# Patient Record
Sex: FEMALE | Race: WHITE | NOT HISPANIC OR LATINO
[De-identification: names, ages, dates, MRNs, and addresses within clinical notes are randomized per-mention and may not be internally consistent; named-entity substitution may affect disease eponyms.]

---

## 2021-01-05 DIAGNOSIS — R92.2 INCONCLUSIVE MAMMOGRAM: ICD-10-CM

## 2021-01-05 DIAGNOSIS — Z78.9 OTHER SPECIFIED HEALTH STATUS: ICD-10-CM

## 2021-01-05 DIAGNOSIS — Z80.1 FAMILY HISTORY OF MALIGNANT NEOPLASM OF TRACHEA, BRONCHUS AND LUNG: ICD-10-CM

## 2021-01-05 DIAGNOSIS — C50.411 MALIGNANT NEOPLASM OF UPPER-OUTER QUADRANT OF RIGHT FEMALE BREAST: ICD-10-CM

## 2021-01-05 DIAGNOSIS — R92.8 OTHER ABNORMAL AND INCONCLUSIVE FINDINGS ON DIAGNOSTIC IMAGING OF BREAST: ICD-10-CM

## 2021-01-05 DIAGNOSIS — N63.0 UNSPECIFIED LUMP IN UNSPECIFIED BREAST: ICD-10-CM

## 2021-01-05 DIAGNOSIS — R92.0 MAMMOGRAPHIC MICROCALCIFICATION FOUND ON DIAGNOSTIC IMAGING OF BREAST: ICD-10-CM

## 2021-01-05 PROBLEM — Z00.00 ENCOUNTER FOR PREVENTIVE HEALTH EXAMINATION: Status: ACTIVE | Noted: 2021-01-05

## 2021-01-05 RX ORDER — OXYCODONE HYDROCHLORIDE AND ACETAMINOPHEN 5; 325 MG/1; MG/1
5-325 TABLET ORAL
Refills: 0 | Status: ACTIVE | COMMUNITY

## 2021-01-05 RX ORDER — CEFADROXIL 500 MG/1
500 CAPSULE ORAL TWICE DAILY
Refills: 0 | Status: ACTIVE | COMMUNITY

## 2021-01-11 ENCOUNTER — APPOINTMENT (OUTPATIENT)
Dept: BREAST CENTER | Facility: CLINIC | Age: 56
End: 2021-01-11
Payer: COMMERCIAL

## 2021-01-11 VITALS
OXYGEN SATURATION: 100 % | TEMPERATURE: 98.1 F | WEIGHT: 117 LBS | DIASTOLIC BLOOD PRESSURE: 92 MMHG | HEART RATE: 79 BPM | SYSTOLIC BLOOD PRESSURE: 147 MMHG | HEIGHT: 61 IN | RESPIRATION RATE: 16 BRPM | BODY MASS INDEX: 22.09 KG/M2

## 2021-01-11 DIAGNOSIS — Z85.3 PERSONAL HISTORY OF MALIGNANT NEOPLASM OF BREAST: ICD-10-CM

## 2021-01-11 PROCEDURE — 99213 OFFICE O/P EST LOW 20 MIN: CPT

## 2021-01-11 PROCEDURE — 99072 ADDL SUPL MATRL&STAF TM PHE: CPT

## 2021-01-11 RX ORDER — ANASTROZOLE TABLETS 1 MG/1
1 TABLET ORAL
Refills: 0 | Status: ACTIVE | COMMUNITY

## 2021-01-11 NOTE — HISTORY OF PRESENT ILLNESS
[FreeTextEntry1] : The patient is a 55-year-old  postmenopausal female of [place] descent.  She underwent menarche at age 11 and had a first child at age 23.  She has no family history of breast or ovarian cancer.  The patient felt the right breast mass towards the upper outer quadrant of the right breast at the end of  and was seen by her gynecologist and underwent a mammography and ultrasound on 2016 at [facility] showing an irregular density with pleomorphic calcifications on mammography in the right breast upper outer quadrant with a corresponding 3.3 cm irregular mass seen on ultrasound at the 10:00 region.  There were no suspicious lymph node seen on ultrasound.  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  She underwent an MRI  at [facility] which showed the 3 cm cancer in the 10:00 region of the right breast with at least 3 separate satellite lesions but no suspicious nodes.  There was a left breast retroareolar density which was suspicious and she underwent an MRI guided core biopsy on 2016 which showed a sclerosing intraductal papilloma.  The decision was made to go forward with a mastectomy and she underwent a right breast total mastectomy and sentinel lymph node biopsy followed by an axillary lymph node dissection on 2016.  She had an expander reconstruction due to the positive lymph node.  Final pathology showed a 5 cm poorly differentiated invasive duct cancer which had lymphovascular invasion.  She had 1 out of 12 positive lymph nodes with a 6 mm macro metastasis but no extranodal extension.  Final pathology showed the cancer to be ER/ID positive and it was HER-2 negative at the time of the mastectomy.  She had a pathologic anatomic stage IIB breast cancer and pathologic prognostic stage IB breast cancer she underwent adjuvant ACT chemotherapy with Dr. Brook Kasper from May 25, 2016 until 2016.  She then underwent postmastectomy radiation at [facility] which finished on 2016.  She underwent a ALISSA/BSO and was placed on Arimidex on 2017.  She did have her right breast expander exchange for permanent implant and had a left breast reduction mastopexy and implant placed prior to her radiation by Dr. Barbosa.  She comes in now for routine follow-up.

## 2021-01-11 NOTE — PAST MEDICAL HISTORY
[Postmenopausal] : The patient is postmenopausal [Menarche Age ____] : age at menarche was [unfilled] [Menopause Age____] : age at menopause was [unfilled] [Total Preg ___] : G[unfilled] [Live Births ___] : P[unfilled]  [Age At Live Birth ___] : Age at live birth: [unfilled] [de-identified] : ALISSA/OLIVIA in 2017

## 2021-01-11 NOTE — ASSESSMENT
[FreeTextEntry1] : The patient is a 55-year-old  postmenopausal female of [place] descent.  She underwent menarche at age 11 and had a first child at age 23.  She has no family history of breast or ovarian cancer.  The patient felt the right breast mass towards the upper outer quadrant of the right breast at the end of  and was seen by her gynecologist and underwent a mammography and ultrasound on 2016 at [facility] showing an irregular density with pleomorphic calcifications on mammography in the right breast upper outer quadrant with a corresponding 3.3 cm irregular mass seen on ultrasound at the 10:00 region.  There were no suspicious lymph node seen on ultrasound.  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  She underwent an MRI  at [facility] which showed the 3 cm cancer in the 10:00 region of the right breast with at least 3 separate satellite lesions but no suspicious nodes.  There was a left breast retroareolar density which was suspicious and she underwent an MRI guided core biopsy on 2016 which showed a sclerosing intraductal papilloma.  The decision was made to go forward with a mastectomy and she underwent a right breast total mastectomy and sentinel lymph node biopsy followed by an axillary lymph node dissection on 2016.  She had an expander reconstruction due to the positive lymph node.  Final pathology showed a 5 cm poorly differentiated invasive duct cancer which had lymphovascular invasion.  She had 1 out of 12 positive lymph nodes with a 6 mm macro metastasis but no extranodal extension.  Final pathology showed the cancer to be ER/MA positive and it was HER-2 negative at the time of the mastectomy.  She had a pathologic anatomic stage IIB breast cancer and pathologic prognostic stage IB breast cancer she underwent adjuvant ACT chemotherapy with Dr. Brook Kasper from May 25, 2016 until 2016.  She then underwent postmastectomy radiation at [facility] which finished on 2016.  She underwent a ALISSA/BSO and was placed on Arimidex on 2017.  She did have her right breast expander exchange for permanent implant and had a left breast reduction mastopexy and implant placed prior to her radiation by Dr. Barbosa.  On exam today she has a lot of radiation changes over the right side with some contracture and asymmetry.  The left breast is more ptotic.  I cannot feel any suspicious findings, however.  The patient had her last left breast mammography and ultrasound on 2020 and I reviewed the films which showed no suspicious findings.  The patient will continue to follow-up with Dr. Brook Kasper and remains on Arimidex.  I will see her again in 1 year and her next left breast mammography and ultrasound will be due at the end of 2021.

## 2021-01-11 NOTE — PHYSICAL EXAM
[Normocephalic] : normocephalic [EOMI] : extra ocular movement intact [Supple] : supple [No Supraclavicular Adenopathy] : no supraclavicular adenopathy [No Cervical Adenopathy] : no cervical adenopathy [Normal Sinus Rhythm] : normal sinus rhythm [Examined in the supine and seated position] : examined in the supine and seated position [No dominant masses] : no dominant masses in right breast  [No dominant masses] : no dominant masses left breast [Breast Mass Right Breast ___cm] : no masses [Breast Nipple Inversion Left] : nipple not inverted [Breast Nipple Retraction Left] : nipple not retracted [Breast Nipple Flattening Left] : nipple not flattened [Breast Nipple Fissures Left] : nipple not fissured [Breast Mass Left Breast ___cm] : no masses [No Axillary Lymphadenopathy] : no left axillary lymphadenopathy [Soft] : abdomen soft [Normal Bowel Sounds] : normal bowel sounds  [de-identified] : On exam the patient has the obvious right breast total mastectomy and implant reconstruction on the right side with the mastopexy and implant on the left side for symmetry.  She underwent postmastectomy radiation over the right side and has a definite mild contracture which is not painful to the patient.  She has a lot of radiation changes over the skin.  The left breast is larger and more ptotic and I cannot feel any suspicious findings in the left breast.  She has no axillary, supraclavicular, or cervical adenopathy.

## 2021-01-11 NOTE — REASON FOR VISIT
[Follow-Up: _____] : a [unfilled] follow-up visit [FreeTextEntry1] : 6-month follow-up with history of right breast mastectomy and axillary lymph node dissection for moderately differentiated 5 cm ER/KS positive HER-2/kerry negative invasive duct cancer with 1 out of 12 positive nodes.  This was a pathologic anatomic stage IIB breast cancer and pathologic prognostic stage IB breast cancer.

## 2021-12-20 ENCOUNTER — APPOINTMENT (OUTPATIENT)
Dept: BREAST CENTER | Facility: CLINIC | Age: 56
End: 2021-12-20
Payer: COMMERCIAL

## 2022-01-25 ENCOUNTER — APPOINTMENT (OUTPATIENT)
Dept: BREAST CENTER | Facility: CLINIC | Age: 57
End: 2022-01-25
Payer: COMMERCIAL

## 2022-01-25 VITALS
HEIGHT: 61 IN | OXYGEN SATURATION: 98 % | HEART RATE: 68 BPM | SYSTOLIC BLOOD PRESSURE: 152 MMHG | BODY MASS INDEX: 24.55 KG/M2 | WEIGHT: 130 LBS | DIASTOLIC BLOOD PRESSURE: 94 MMHG

## 2022-01-25 DIAGNOSIS — Z90.11 ACQUIRED ABSENCE OF RIGHT BREAST AND NIPPLE: ICD-10-CM

## 2022-01-25 DIAGNOSIS — Z85.3 PERSONAL HISTORY OF MALIGNANT NEOPLASM OF BREAST: ICD-10-CM

## 2022-01-25 PROCEDURE — 99213 OFFICE O/P EST LOW 20 MIN: CPT

## 2022-01-25 NOTE — PHYSICAL EXAM
[Normocephalic] : normocephalic [EOMI] : extra ocular movement intact [Supple] : supple [No Supraclavicular Adenopathy] : no supraclavicular adenopathy [No Cervical Adenopathy] : no cervical adenopathy [Normal Sinus Rhythm] : normal sinus rhythm [Examined in the supine and seated position] : examined in the supine and seated position [No dominant masses] : no dominant masses in right breast  [No dominant masses] : no dominant masses left breast [Breast Mass Right Breast ___cm] : no masses [Breast Mass Left Breast ___cm] : no masses [No Axillary Lymphadenopathy] : no left axillary lymphadenopathy [No Nipple Retraction] : no left nipple retraction [No Nipple Discharge] : no left nipple discharge [Breast Nipple Inversion Left] : nipple not inverted [Breast Nipple Retraction Left] : nipple not retracted [Breast Nipple Flattening Left] : nipple not flattened [Breast Nipple Fissures Left] : nipple not fissured [Breast Abnormal Lactation (Galactorrhea) Left] : no galactorrhea [Breast Abnormal Secretion Bloody Fluid Left] : no bloody discharge [Breast Abnormal Secretion Serous Fluid Left] : no serous discharge [Breast Abnormal Secretion Opalescent Fluid Left] : no milky discharge [No Edema] : no edema [No Rashes] : no rashes [No Ulceration] : no ulceration [de-identified] : On exam the patient has the obvious right breast total mastectomy and implant reconstruction on the right side with the mastopexy and implant on the left side for symmetry.  She underwent postmastectomy radiation over the right side and has a definite moderate contracture which is not painful to the patient.  She has a lot of radiation changes over the skin.  The left breast is larger and more ptotic and I cannot feel any suspicious findings in the left breast.  She has no axillary, supraclavicular, or cervical adenopathy. [de-identified] : Status post total mastectomy with implant reconstruction and postmastectomy radiation with moderate contracture but no evidence of recurrence. [de-identified] : Status post reduction mastopexy and implant for symmetry with no suspicious findings

## 2022-01-25 NOTE — REASON FOR VISIT
[Follow-Up: _____] : a [unfilled] follow-up visit [FreeTextEntry1] : She comes in with a history of a right breast mastectomy and axillary lymph node dissection for a moderately differentiated 5 cm ER/IA positive HER-2/kerry negative invasive duct cancer with 1 out of 12 positive nodes.  This was a pathologic anatomic stage IIB breast cancer and pathologic prognostic stage IB breast cancer.  She underwent AC-T chemotherapy and then underwent postmastectomy radiation which finished in December 2016.  She was placed on Arimidex in March 2017.  She had her right breast expander exchange for a permanent implant and underwent a left breast reduction mastopexy and implant prior to receiving radiation.  She comes in for routine follow-up.

## 2022-01-25 NOTE — HISTORY OF PRESENT ILLNESS
[FreeTextEntry1] : The patient is a 56-year-old  postmenopausal female of [place] descent.  She underwent menarche at age 11 and had a first child at age 23.  She has no family history of breast or ovarian cancer.  The patient felt the right breast mass towards the upper outer quadrant of the right breast at the end of  and was seen by her gynecologist and underwent a mammography and ultrasound on 2016 at [facility] showing an irregular density with pleomorphic calcifications on mammography in the right breast upper outer quadrant with a corresponding 3.3 cm irregular mass seen on ultrasound at the 10:00 region.  There were no suspicious lymph node seen on ultrasound.  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  She underwent an MRI  at [facility] which showed the 3 cm cancer in the 10:00 region of the right breast with at least 3 separate satellite lesions but no suspicious nodes.  There was a left breast retroareolar density which was suspicious and she underwent an MRI guided core biopsy on 2016 which showed a sclerosing intraductal papilloma.  The decision was made to go forward with a mastectomy and she underwent a right breast total mastectomy and sentinel lymph node biopsy followed by an axillary lymph node dissection on 2016.  She had an expander reconstruction due to the positive lymph node.  Final pathology showed a 5 cm poorly differentiated invasive duct cancer which had lymphovascular invasion.  She had 1 out of 12 positive lymph nodes with a 6 mm macro metastasis but no extranodal extension.  Final pathology showed the cancer to be ER/AR positive and it was HER-2 negative at the time of the mastectomy.  She had a pathologic anatomic stage IIB breast cancer and pathologic prognostic stage IB breast cancer she underwent adjuvant ACT chemotherapy with Dr. Brook Kasper from May 25, 2016 until 2016.  She then underwent postmastectomy radiation at [facility] which finished on 2016.  She underwent a ALISSA/BSO and was placed on Arimidex on 2017.  She did have her right breast expander exchanged for a permanent implant and had a left breast reduction mastopexy and implant placed prior to her radiation by Dr. Barbosa.  She comes in now for routine follow-up.

## 2022-01-25 NOTE — PAST MEDICAL HISTORY
[Postmenopausal] : The patient is postmenopausal [Menarche Age ____] : age at menarche was [unfilled] [Menopause Age____] : age at menopause was [unfilled] [Total Preg ___] : G[unfilled] [Live Births ___] : P[unfilled]  [Age At Live Birth ___] : Age at live birth: [unfilled] [de-identified] : ALISSA/OLIVIA in 2017

## 2022-01-25 NOTE — ASSESSMENT
[FreeTextEntry1] : The patient is a 56-year-old  postmenopausal female of [place] descent.  She underwent menarche at age 11 and had a first child at age 23.  She has no family history of breast or ovarian cancer.  The patient felt the right breast mass towards the upper outer quadrant of the right breast at the end of  and was seen by her gynecologist and underwent a mammography and ultrasound on 2016 at [facility] showing an irregular density with pleomorphic calcifications on mammography in the right breast upper outer quadrant with a corresponding 3.3 cm irregular mass seen on ultrasound at the 10:00 region.  There were no suspicious lymph node seen on ultrasound.  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  She underwent an MRI  at [facility] which showed the 3 cm cancer in the 10:00 region of the right breast with at least 3 separate satellite lesions but no suspicious nodes.  There was a left breast retroareolar density which was suspicious and she underwent an MRI guided core biopsy on 2016 which showed a sclerosing intraductal papilloma.  The decision was made to go forward with a mastectomy and she underwent a right breast total mastectomy and sentinel lymph node biopsy followed by an axillary lymph node dissection on 2016.  She had an expander reconstruction due to the positive lymph node.  Final pathology showed a 5 cm poorly differentiated invasive duct cancer which had lymphovascular invasion.  She had 1 out of 12 positive lymph nodes with a 6 mm macro metastasis but no extranodal extension.  Final pathology showed the cancer to be ER/WY positive and it was HER-2 negative at the time of the mastectomy.  She had a pathologic anatomic stage IIB breast cancer and pathologic prognostic stage IB breast cancer she underwent adjuvant ACT chemotherapy with Dr. Brook Kasper from May 25, 2016 until 2016.  She then underwent postmastectomy radiation at [facility] which finished on 2016.  She underwent a ALISSA/BSO and was placed on Arimidex on 2017.  She did have her right breast expander exchange for a permanent implant and had a left breast reduction mastopexy and implant placed prior to her radiation by Dr. Barbosa.  On exam today, she has a lot of radiation changes over the right side with some contracture and asymmetry.  The left breast is more ptotic.  I cannot feel any suspicious findings, however.  The patient underwent her last left breast mammography and ultrasound which was reviewed from 2021 performed at [facility] which showed no suspicious findings.  The patient will continue to follow-up with Dr. Brook Kasper and remains on Arimidex.  She was reassured and I will see her again in 1 year and her next left breast mammography and ultrasound will be due in 2022 and she was given prescriptions.

## 2022-03-09 NOTE — REASON FOR VISIT
[Follow-Up: _____] : a [unfilled] follow-up visit [FreeTextEntry1] : She comes in with a history of a right breast mastectomy and axillary lymph node dissection for a moderately differentiated 5 cm ER/GA positive HER-2/kerry negative invasive duct cancer with 1 out of 12 positive nodes.  This was a pathologic anatomic stage IIB breast cancer and pathologic prognostic stage IB breast cancer.  She underwent AC-T chemotherapy and then underwent postmastectomy radiation which finished in December 2016.  She was placed on Arimidex in March 2017.  She had her right breast expander exchange for a permanent implant and underwent a left breast reduction mastopexy and implant prior to receiving radiation.  She comes in for routine follow-up.

## 2022-03-09 NOTE — ASSESSMENT
[FreeTextEntry1] : The patient is a 56-year-old  postmenopausal female of [place] descent.  She underwent menarche at age 11 and had a first child at age 23.  She has no family history of breast or ovarian cancer.  The patient felt the right breast mass towards the upper outer quadrant of the right breast at the end of  and was seen by her gynecologist and underwent a mammography and ultrasound on 2016 at [facility] showing an irregular density with pleomorphic calcifications on mammography in the right breast upper outer quadrant with a corresponding 3.3 cm irregular mass seen on ultrasound at the 10:00 region.  There were no suspicious lymph node seen on ultrasound.  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  She underwent an MRI  at [facility] which showed the 3 cm cancer in the 10:00 region of the right breast with at least 3 separate satellite lesions but no suspicious nodes.  There was a left breast retroareolar density which was suspicious and she underwent an MRI guided core biopsy on 2016 which showed a sclerosing intraductal papilloma.  The decision was made to go forward with a mastectomy and she underwent a right breast total mastectomy and sentinel lymph node biopsy followed by an axillary lymph node dissection on 2016.  She had an expander reconstruction due to the positive lymph node.  Final pathology showed a 5 cm poorly differentiated invasive duct cancer which had lymphovascular invasion.  She had 1 out of 12 positive lymph nodes with a 6 mm macro metastasis but no extranodal extension.  Final pathology showed the cancer to be ER/ND positive and it was HER-2 negative at the time of the mastectomy.  She had a pathologic anatomic stage IIB breast cancer and pathologic prognostic stage IB breast cancer she underwent adjuvant ACT chemotherapy with Dr. Brook Kasper from May 25, 2016 until 2016.  She then underwent postmastectomy radiation at [facility] which finished on 2016.  She underwent a ALISSA/BSO and was placed on Arimidex on 2017.  She did have her right breast expander exchange for permanent implant and had a left breast reduction mastopexy and implant placed prior to her radiation by Dr. Barbosa.  On exam today she has a lot of radiation changes over the right side with some contracture and asymmetry.  The left breast is more ptotic.  I cannot feel any suspicious findings, however.  The patient underwent her last left breast mammography and ultrasound which was reviewed from ??????.  The patient will continue to follow-up with Dr. Brook Kasper and remains on Arimidex.  She was reassured and I will see her again in 1 year and her next left breast mammography and ultrasound will be due ?????  and she was given prescriptions.

## 2022-03-09 NOTE — HISTORY OF PRESENT ILLNESS
[FreeTextEntry1] : The patient is a 56-year-old  postmenopausal female of [place] descent.  She underwent menarche at age 11 and had a first child at age 23.  She has no family history of breast or ovarian cancer.  The patient felt the right breast mass towards the upper outer quadrant of the right breast at the end of  and was seen by her gynecologist and underwent a mammography and ultrasound on 2016 at [facility] showing an irregular density with pleomorphic calcifications on mammography in the right breast upper outer quadrant with a corresponding 3.3 cm irregular mass seen on ultrasound at the 10:00 region.  There were no suspicious lymph node seen on ultrasound.  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  She underwent an MRI  at [facility] which showed the 3 cm cancer in the 10:00 region of the right breast with at least 3 separate satellite lesions but no suspicious nodes.  There was a left breast retroareolar density which was suspicious and she underwent an MRI guided core biopsy on 2016 which showed a sclerosing intraductal papilloma.  The decision was made to go forward with a mastectomy and she underwent a right breast total mastectomy and sentinel lymph node biopsy followed by an axillary lymph node dissection on 2016.  She had an expander reconstruction due to the positive lymph node.  Final pathology showed a 5 cm poorly differentiated invasive duct cancer which had lymphovascular invasion.  She had 1 out of 12 positive lymph nodes with a 6 mm macro metastasis but no extranodal extension.  Final pathology showed the cancer to be ER/RI positive and it was HER-2 negative at the time of the mastectomy.  She had a pathologic anatomic stage IIB breast cancer and pathologic prognostic stage IB breast cancer she underwent adjuvant ACT chemotherapy with Dr. Brook Kasper from May 25, 2016 until 2016.  She then underwent postmastectomy radiation at [facility] which finished on 2016.  She underwent a ALISSA/BSO and was placed on Arimidex on 2017.  She did have her right breast expander exchange for permanent implant and had a left breast reduction mastopexy and implant placed prior to her radiation by Dr. Barbosa.  She comes in now for routine follow-up.

## 2022-03-09 NOTE — PAST MEDICAL HISTORY
[Postmenopausal] : The patient is postmenopausal [Menarche Age ____] : age at menarche was [unfilled] [Menopause Age____] : age at menopause was [unfilled] [Total Preg ___] : G[unfilled] [Live Births ___] : P[unfilled]  [Age At Live Birth ___] : Age at live birth: [unfilled] [de-identified] : ALISSA/OLIVIA in 2017

## 2022-03-09 NOTE — PHYSICAL EXAM
[Normocephalic] : normocephalic [EOMI] : extra ocular movement intact [Supple] : supple [No Supraclavicular Adenopathy] : no supraclavicular adenopathy [No Cervical Adenopathy] : no cervical adenopathy [Normal Sinus Rhythm] : normal sinus rhythm [Examined in the supine and seated position] : examined in the supine and seated position [No dominant masses] : no dominant masses in right breast  [No dominant masses] : no dominant masses left breast [Breast Mass Right Breast ___cm] : no masses [Breast Mass Left Breast ___cm] : no masses [No Axillary Lymphadenopathy] : no left axillary lymphadenopathy [Soft] : abdomen soft [Normal Bowel Sounds] : normal bowel sounds  [Breast Nipple Inversion Left] : nipple not inverted [Breast Nipple Retraction Left] : nipple not retracted [Breast Nipple Flattening Left] : nipple not flattened [Breast Nipple Fissures Left] : nipple not fissured [de-identified] : On exam the patient has the obvious right breast total mastectomy and implant reconstruction on the right side with the mastopexy and implant on the left side for symmetry.  She underwent postmastectomy radiation over the right side and has a definite mild contracture which is not painful to the patient.  She has a lot of radiation changes over the skin.  The left breast is larger and more ptotic and I cannot feel any suspicious findings in the left breast.  She has no axillary, supraclavicular, or cervical adenopathy.